# Patient Record
Sex: MALE | Race: WHITE | URBAN - METROPOLITAN AREA
[De-identification: names, ages, dates, MRNs, and addresses within clinical notes are randomized per-mention and may not be internally consistent; named-entity substitution may affect disease eponyms.]

---

## 2022-03-14 ENCOUNTER — OFFICE VISIT (OUTPATIENT)
Dept: URBAN - METROPOLITAN AREA CLINIC 58 | Facility: CLINIC | Age: 87
End: 2022-03-14
Payer: COMMERCIAL

## 2022-03-14 DIAGNOSIS — H02.122 MECHANICAL ECTROPION OF RIGHT LOWER EYELID: ICD-10-CM

## 2022-03-14 DIAGNOSIS — H02.125 MECHANICAL ECTROPION OF LEFT LOWER EYELID: ICD-10-CM

## 2022-03-14 PROCEDURE — 99204 OFFICE O/P NEW MOD 45 MIN: CPT | Performed by: OPTOMETRIST

## 2022-03-14 ASSESSMENT — INTRAOCULAR PRESSURE
OD: 17
OS: 15

## 2022-03-14 ASSESSMENT — KERATOMETRY
OS: 42.75
OD: 42.88

## 2022-03-14 ASSESSMENT — VISUAL ACUITY
OS: 20/70
OD: 20/80

## 2022-03-14 NOTE — IMPRESSION/PLAN
Impression: Age-related nuclear cataract, bilateral: H25.13. Plan: Discussed diagnosis in detail. I recommended cataract surgery. Consult with Dr. Carolyn Rodriguez for pre-op for cataract surgery.

## 2022-03-14 NOTE — IMPRESSION/PLAN
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 Plan: Discussed diagnosis in detail with patient. No treatment is required at this time. Will continue to observe condition and or symptoms. Call if [address] worsens. Pt has had ectropion repair before without success.

## 2022-04-04 ENCOUNTER — OFFICE VISIT (OUTPATIENT)
Dept: URBAN - METROPOLITAN AREA CLINIC 58 | Facility: CLINIC | Age: 87
End: 2022-04-04
Payer: COMMERCIAL

## 2022-04-04 DIAGNOSIS — H25.13 AGE-RELATED NUCLEAR CATARACT, BILATERAL: Primary | ICD-10-CM

## 2022-04-04 PROCEDURE — 99204 OFFICE O/P NEW MOD 45 MIN: CPT | Performed by: OPHTHALMOLOGY

## 2022-04-04 RX ORDER — ERYTHROMYCIN 5 MG/G
OINTMENT OPHTHALMIC
Qty: 3.5 | Refills: 0 | Status: ACTIVE
Start: 2022-04-04

## 2022-04-04 ASSESSMENT — INTRAOCULAR PRESSURE
OS: 16
OD: 15

## 2022-04-04 ASSESSMENT — KERATOMETRY
OS: 42.75
OD: 42.75

## 2022-04-04 ASSESSMENT — VISUAL ACUITY
OD: 20/80
OS: 20/70

## 2022-04-04 NOTE — IMPRESSION/PLAN
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 Plan: Discussed diagnosis in detail with patient. Start Erythromycin OU QHS. Will continue to observe condition and or symptoms. Call if [address] worsens. Pt has had ectropion repair before without success.

## 2022-04-21 ENCOUNTER — TESTING ONLY (OUTPATIENT)
Dept: URBAN - METROPOLITAN AREA CLINIC 58 | Facility: CLINIC | Age: 87
End: 2022-04-21
Payer: COMMERCIAL

## 2022-04-21 DIAGNOSIS — H25.13 AGE-RELATED NUCLEAR CATARACT, BILATERAL: Primary | ICD-10-CM

## 2022-04-21 PROCEDURE — W9997 IOL SELECTION: HCPCS | Performed by: OPHTHALMOLOGY

## 2022-04-21 ASSESSMENT — PACHYMETRY
OS: 24.48
OS: 3.25
OD: 3.23
OD: 24.54

## 2022-05-09 ENCOUNTER — SURGERY (OUTPATIENT)
Dept: URBAN - METROPOLITAN AREA SURGERY 32 | Facility: SURGERY | Age: 87
End: 2022-05-09
Payer: COMMERCIAL

## 2022-05-09 DIAGNOSIS — H25.13 AGE-RELATED NUCLEAR CATARACT, BILATERAL: Primary | ICD-10-CM

## 2022-05-09 PROCEDURE — 66984 XCAPSL CTRC RMVL W/O ECP: CPT | Performed by: OPHTHALMOLOGY

## 2022-05-10 ENCOUNTER — POST-OPERATIVE VISIT (OUTPATIENT)
Dept: URBAN - METROPOLITAN AREA CLINIC 58 | Facility: CLINIC | Age: 87
End: 2022-05-10
Payer: COMMERCIAL

## 2022-05-10 DIAGNOSIS — Z48.810 ENCOUNTER FOR SURGICAL AFTERCARE FOLLOWING SURGERY ON A SENSE ORGAN: Primary | ICD-10-CM

## 2022-05-10 PROCEDURE — 99024 POSTOP FOLLOW-UP VISIT: CPT | Performed by: OPTOMETRIST

## 2022-05-10 ASSESSMENT — INTRAOCULAR PRESSURE
OD: 18
OS: 16

## 2022-05-10 NOTE — IMPRESSION/PLAN
Impression: S/P Cataract Extraction by phacoemulsification with IOL placement, Dexycu OS - 1 Day. Encounter for surgical aftercare following surgery on a sense organ  Z48.810.  Excellent post op course   Condition is improving - Plan: 1 week po2 OK to do erythromycin shayne qhs

## 2022-05-18 ENCOUNTER — POST-OPERATIVE VISIT (OUTPATIENT)
Dept: URBAN - METROPOLITAN AREA CLINIC 58 | Facility: CLINIC | Age: 87
End: 2022-05-18
Payer: COMMERCIAL

## 2022-05-18 PROCEDURE — 99024 POSTOP FOLLOW-UP VISIT: CPT | Performed by: OPTOMETRIST

## 2022-05-18 RX ORDER — ERYTHROMYCIN 5 MG/G
OINTMENT OPHTHALMIC
Qty: 3.5 | Refills: 4 | Status: ACTIVE
Start: 2022-05-18

## 2022-05-18 ASSESSMENT — INTRAOCULAR PRESSURE
OD: 16
OS: 17

## 2022-05-18 ASSESSMENT — VISUAL ACUITY: OS: 20/40

## 2022-05-18 NOTE — IMPRESSION/PLAN
Impression: S/P Cataract Extraction by phacoemulsification with IOL placement, Dexycu OS - 9 Days. Encounter for surgical aftercare following surgery on a sense organ  Z48.810.  Excellent post op course   Condition is improving - Plan: Schedule 2nd eye cat sx, disability due pt not able to read OD erythromycin shayne OD qhs

## 2022-05-31 ENCOUNTER — SURGERY (OUTPATIENT)
Dept: URBAN - METROPOLITAN AREA SURGERY 32 | Facility: SURGERY | Age: 87
End: 2022-05-31
Payer: COMMERCIAL

## 2022-05-31 DIAGNOSIS — H25.11 AGE-RELATED NUCLEAR CATARACT, RIGHT EYE: Primary | ICD-10-CM

## 2022-05-31 PROCEDURE — 66984 XCAPSL CTRC RMVL W/O ECP: CPT | Performed by: OPHTHALMOLOGY

## 2022-06-01 ENCOUNTER — POST-OPERATIVE VISIT (OUTPATIENT)
Dept: URBAN - METROPOLITAN AREA CLINIC 58 | Facility: CLINIC | Age: 87
End: 2022-06-01
Payer: COMMERCIAL

## 2022-06-01 DIAGNOSIS — Z96.1 PRESENCE OF INTRAOCULAR LENS: Primary | ICD-10-CM

## 2022-06-01 PROCEDURE — 99024 POSTOP FOLLOW-UP VISIT: CPT | Performed by: STUDENT IN AN ORGANIZED HEALTH CARE EDUCATION/TRAINING PROGRAM

## 2022-06-01 ASSESSMENT — INTRAOCULAR PRESSURE
OS: 14
OD: 29

## 2022-06-01 NOTE — IMPRESSION/PLAN
Impression: S/P Cataract Extraction by phacoemulsification with IOL placement OD - 1 Day. Presence of intraocular lens  Z96.1.  Excellent post op course   Condition is improving - Plan: 1 week PO2 1gtt of Timolol and brimonidine OD given in clinic

## 2022-06-09 ENCOUNTER — POST-OPERATIVE VISIT (OUTPATIENT)
Dept: URBAN - METROPOLITAN AREA CLINIC 58 | Facility: CLINIC | Age: 87
End: 2022-06-09
Payer: COMMERCIAL

## 2022-06-09 DIAGNOSIS — Z96.1 PRESENCE OF INTRAOCULAR LENS: Primary | ICD-10-CM

## 2022-06-09 PROCEDURE — 99024 POSTOP FOLLOW-UP VISIT: CPT | Performed by: OPTOMETRIST

## 2022-06-09 ASSESSMENT — VISUAL ACUITY
OD: 20/60
OS: 20/50

## 2022-06-09 ASSESSMENT — INTRAOCULAR PRESSURE
OS: 15
OD: 16

## 2022-06-09 NOTE — IMPRESSION/PLAN
Impression: S/P Cataract Extraction by phacoemulsification with IOL placement, Dexycu OD - 9 Days. Presence of intraocular lens  Z96.1. Excellent post op course   Condition is improving - Plan: 3 weeks PO3 OK to schedule refraction with Dr. Shanita Wilson in 3 wks